# Patient Record
Sex: FEMALE | Race: WHITE | NOT HISPANIC OR LATINO | Employment: STUDENT | URBAN - METROPOLITAN AREA
[De-identification: names, ages, dates, MRNs, and addresses within clinical notes are randomized per-mention and may not be internally consistent; named-entity substitution may affect disease eponyms.]

---

## 2024-07-26 ENCOUNTER — APPOINTMENT (OUTPATIENT)
Dept: PRIMARY CARE | Facility: CLINIC | Age: 19
End: 2024-07-26
Payer: COMMERCIAL

## 2024-07-26 VITALS
HEIGHT: 64 IN | BODY MASS INDEX: 20.66 KG/M2 | SYSTOLIC BLOOD PRESSURE: 106 MMHG | WEIGHT: 121 LBS | OXYGEN SATURATION: 99 % | DIASTOLIC BLOOD PRESSURE: 68 MMHG | HEART RATE: 53 BPM

## 2024-07-26 DIAGNOSIS — Z00.00 PHYSICAL EXAM: Primary | ICD-10-CM

## 2024-07-26 PROCEDURE — 99395 PREV VISIT EST AGE 18-39: CPT | Performed by: FAMILY MEDICINE

## 2024-07-26 PROCEDURE — 3008F BODY MASS INDEX DOCD: CPT | Performed by: FAMILY MEDICINE

## 2024-07-26 PROCEDURE — 1036F TOBACCO NON-USER: CPT | Performed by: FAMILY MEDICINE

## 2024-07-26 ASSESSMENT — ENCOUNTER SYMPTOMS
NEUROLOGICAL NEGATIVE: 1
PSYCHIATRIC NEGATIVE: 1
CONSTITUTIONAL NEGATIVE: 1
RESPIRATORY NEGATIVE: 1
GASTROINTESTINAL NEGATIVE: 1
CARDIOVASCULAR NEGATIVE: 1
MUSCULOSKELETAL NEGATIVE: 1

## 2024-07-26 ASSESSMENT — PATIENT HEALTH QUESTIONNAIRE - PHQ9
1. LITTLE INTEREST OR PLEASURE IN DOING THINGS: NOT AT ALL
SUM OF ALL RESPONSES TO PHQ9 QUESTIONS 1 AND 2: 0
2. FEELING DOWN, DEPRESSED OR HOPELESS: NOT AT ALL

## 2024-07-26 NOTE — PROGRESS NOTES
Subjective   Patient ID: Yecenia Bay is a 18 y.o. female who presents for Annual Exam.  HPI  Patient in for well visit patient is a  without any significant complaints  Review of Systems   Constitutional: Negative.    HENT: Negative.     Respiratory: Negative.     Cardiovascular: Negative.    Gastrointestinal: Negative.    Genitourinary: Negative.    Musculoskeletal: Negative.    Neurological: Negative.    Psychiatric/Behavioral: Negative.         Objective   Physical Exam  General no acute process no icterus well-hydrated alert active oriented    HEENT normocephalic no palpable tenderness eyes pupils equal reactive light and accommodation extraocular muscles intact no icterus and/or erythema ears benign external auditory canal no gross deformities nose no discharge drainage erythema bleeding throat no erythema.    Heart regular rate and rhythm without S3-S4 or murmur    Lungs clear to auscultation x2 no rales or rhonchi    Abdomen soft nontender nondistended no palpable masses no organomegaly splenomegaly.    Integument no rash no lumps bumps or concerning lesions.    Neurologic no tics tremors or seizures no decreased range of motion or ataxia.    Musculoskeletal good range of motion no gross abnormalities noted  Assessment/Plan            Cristhian Myers DO 07/26/24 8:34 AM

## 2024-11-26 ENCOUNTER — APPOINTMENT (OUTPATIENT)
Dept: OBSTETRICS AND GYNECOLOGY | Facility: CLINIC | Age: 19
End: 2024-11-26
Payer: COMMERCIAL

## 2024-11-26 ENCOUNTER — LAB (OUTPATIENT)
Dept: LAB | Facility: LAB | Age: 19
End: 2024-11-26
Payer: COMMERCIAL

## 2024-11-26 VITALS
BODY MASS INDEX: 20.83 KG/M2 | DIASTOLIC BLOOD PRESSURE: 66 MMHG | HEIGHT: 64 IN | WEIGHT: 122 LBS | SYSTOLIC BLOOD PRESSURE: 90 MMHG

## 2024-11-26 DIAGNOSIS — N91.2 AMENORRHEA: Primary | ICD-10-CM

## 2024-11-26 DIAGNOSIS — N91.2 AMENORRHEA: ICD-10-CM

## 2024-11-26 LAB
25(OH)D3 SERPL-MCNC: 36 NG/ML (ref 30–100)
DHEA-S SERPL-MCNC: 102 UG/DL (ref 65–395)
ESTRADIOL SERPL-MCNC: 123 PG/ML
FSH SERPL-ACNC: 5.2 IU/L
LH SERPL-ACNC: 5.2 IU/L
MAGNESIUM SERPL-MCNC: 2.29 MG/DL (ref 1.6–2.4)
PROLACTIN SERPL-MCNC: 5.3 UG/L (ref 3–20)
VIT B12 SERPL-MCNC: 599 PG/ML (ref 211–911)

## 2024-11-26 PROCEDURE — 82306 VITAMIN D 25 HYDROXY: CPT

## 2024-11-26 PROCEDURE — 36415 COLL VENOUS BLD VENIPUNCTURE: CPT

## 2024-11-26 PROCEDURE — 99204 OFFICE O/P NEW MOD 45 MIN: CPT | Performed by: OBSTETRICS & GYNECOLOGY

## 2024-11-26 PROCEDURE — 82607 VITAMIN B-12: CPT

## 2024-11-26 PROCEDURE — 83002 ASSAY OF GONADOTROPIN (LH): CPT

## 2024-11-26 PROCEDURE — 83001 ASSAY OF GONADOTROPIN (FSH): CPT

## 2024-11-26 PROCEDURE — 84146 ASSAY OF PROLACTIN: CPT

## 2024-11-26 PROCEDURE — 3008F BODY MASS INDEX DOCD: CPT | Performed by: OBSTETRICS & GYNECOLOGY

## 2024-11-26 PROCEDURE — 82670 ASSAY OF TOTAL ESTRADIOL: CPT

## 2024-11-26 PROCEDURE — 83735 ASSAY OF MAGNESIUM: CPT

## 2024-11-26 PROCEDURE — 82627 DEHYDROEPIANDROSTERONE: CPT

## 2024-11-29 NOTE — RESULT ENCOUNTER NOTE
The blood work thus far is normal.  The testosterone level is pending, but there are no signs of hormonal problems, early menopause or PCOS.  I suspect the lack of menses is due to the activity level and being underweight.  The safest way to protect your bones and manage amenorrhea is a low-dose birth control pill, vaginal ring or patch.  If you would like to begin one of these methods, let me know.  I recommend follow-up in about 6 months.

## 2024-11-29 NOTE — PROGRESS NOTES
Yecenia Bay is a 19 y.o. female who presents with a chief complaint of New Patient Visit (New patient)  SUBJECTIVE  This is a new patient to this practice.  Her mother is present for the entire visit.  The patient has been amenorrheic since May 2024.  She typically has regular cycles but no spotting or bleeding since May.  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  She is thin.  There are no cramps or pain, no discharge, no dysuria or change in bowel habits.  She is not sexually active.    She mentions she will have intermittent symptoms consistent with PMS, but no actual flow.  Review of recent labs done at school notes a recent hemoglobin of 12.4, and normal TSH of 2.33, normal chemistries.    Past Medical History:   Diagnosis Date    Central auditory processing disorder 02/22/2018    Auditory processing disorder    Other conditions influencing health status 12/07/2017    History of cough    Personal history of other diseases of the respiratory system 12/01/2017    History of nasal discharge    Personal history of other specified conditions     History of fever    Rash and other nonspecific skin eruption 03/06/2018    Rash    Streptococcal pharyngitis 12/01/2017    Strep pharyngitis     History reviewed. No pertinent surgical history.  Social History     Socioeconomic History    Marital status: Single   Tobacco Use    Smoking status: Never    Smokeless tobacco: Never   Substance and Sexual Activity    Alcohol use: Not Currently    Drug use: Never    Sexual activity: Not Currently     Social Drivers of Health     Financial Resource Strain: Not on File (1/25/2021)    Received from [facility]    Financial Resource Strain     Financial Resource Strain: 0   Food Insecurity: Not on File (1/25/2021)    Received from [facility]    Food Insecurity     Food: 0   Transportation Needs: Not on File (1/25/2021)    Received from [facility]    Transportation Needs     Transportation: 0   Physical Activity: Not on File  "(2021)    Received from [facility]    Physical Activity     Physical Activity: 0   Stress: Not on File (2021)    Received from [facility]    Stress     Stress: 0   Social Connections: Not on File (2021)    Received from [facility]    Social Connections     Social Connections and Isolation: 0   Housing Stability: Not on File (2021)    Received from [facility]    Housing Stability     Housin     Family History   Problem Relation Name Age of Onset    Other (adenomyosis) Mother      Breast cancer Paternal Grandmother         OB History    Para Term  AB Living   0 0 0 0 0 0   SAB IAB Ectopic Multiple Live Births   0 0 0 0 0       OBJECTIVE  Allergies   Allergen Reactions    Latex Unknown     Other reaction(s): Intolerance   Mostly bandaids   Causes skin irritation    Mostly bandaids   Causes skin irritation      (Not in a hospital admission)       Review of Systems  Negative except amenorrhea  Physical Exam  This is a well-appearing thin female in no apparent distress.  Constitutional: Alert and in no acute distress. Well developed, well nourished.   Head and Face: Head and face: Normal.    Eyes: Normal external exam - nonicteric sclera  Neck: No neck asymmetry. Supple. Thyroid not enlarged and there were no palpable thyroid nodules.    Pulmonary: No respiratory distress.   Abdomen: Soft nontender; no abdominal mass palpated.    Genitourinary: Deferred, not sexually active, no symptoms.  Musculoskeletal:  normal movements of all extremities.    Neurologic: Non-focal. Grossly intact.   Psychiatric: Alert and oriented x 3. Affect normal to patient baseline. Mood: Appropriate.    BP 90/66   Ht 1.626 m (5' 4\")   Wt 55.3 kg (122 lb)   LMP 05/15/2024 (Approximate) Comment: unsure  BMI 20.94 kg/m²    Problem List Items Addressed This Visit    None  Visit Diagnoses       Amenorrhea    -  Primary    Relevant Orders    FSH & LH (Completed)    Prolactin (Completed)    Estradiol (Completed)    Vitamin D " 25-Hydroxy,Total (for eval of Vitamin D levels) (Completed)    Magnesium (Completed)    DHEA-Sulfate (Completed)    Testosterone,Free and Total    Vitamin B12 (Completed)        This is a 19-year-old female with amenorrhea since May 2024.  We discussed her activity level and the possibility of exercise-induced hypothalamic amenorrhea.  Her recent TSH was normal, other labs such as FSH/ LH prolactin estradiol, vitamin D, magnesium, DHEA-S and testosterone were ordered.  We discussed the the management of amenorrhea would be birth control pills or other estrogen-containing contraception such as vaginal ring or patch.  She currently declines contraception.    I do recommend weightbearing exercise and dietary calcium intake to maintain bone health.  I expect resumption of menses with weight gain or less activity when she is off season.  She will call me with concerns, I recommend follow-up in about 6 months.

## 2024-12-02 LAB
TESTOSTERONE FREE (CHAN): 2.7 PG/ML (ref 0.1–6.4)
TESTOSTERONE,TOTAL,LC-MS/MS: 31 NG/DL (ref 2–45)

## 2025-03-05 ENCOUNTER — APPOINTMENT (OUTPATIENT)
Dept: OBSTETRICS AND GYNECOLOGY | Facility: CLINIC | Age: 20
End: 2025-03-05
Payer: COMMERCIAL